# Patient Record
Sex: FEMALE | Employment: UNEMPLOYED | URBAN - METROPOLITAN AREA
[De-identification: names, ages, dates, MRNs, and addresses within clinical notes are randomized per-mention and may not be internally consistent; named-entity substitution may affect disease eponyms.]

---

## 2018-09-06 NOTE — H&P
[address] [facility] [address] Gastroenterology                                                                                                  Clinic History and Physical     Pa esophageal, gastric cancer  - No family history of IBD.     Prior endoscopies:  ~10 - 15 yrs ago, EGD performed by unspecified GI practice, findings per patient: Peptic ulcer disease, started on Nexium    Social Hx:  - No smoking  + social etoh  - Denies il Blood pressure 124/73, pulse 51, resp. rate 16, height 5' 6\" (1.676 m), weight 163 lb (73.9 kg).     Gen: patient appears comfortable and in no acute distress  HEENT: conjunctiva pink, the sclera appears anicteric, oropharynx clear, mucus membranes appea habits are still irregular compared to her baseline but has substantially improved since she was on antibiotic therapy. She continues a fiber supplement and probiotics daily which has been helpful. I would continue these measures and cautiously observe.

## 2018-09-17 ENCOUNTER — TELEPHONE (OUTPATIENT)
Dept: GASTROENTEROLOGY | Facility: CLINIC | Age: 48
End: 2018-09-17

## 2018-09-17 ENCOUNTER — OFFICE VISIT (OUTPATIENT)
Dept: GASTROENTEROLOGY | Facility: CLINIC | Age: 48
End: 2018-09-17
Payer: COMMERCIAL

## 2018-09-17 VITALS
WEIGHT: 163 LBS | HEART RATE: 51 BPM | DIASTOLIC BLOOD PRESSURE: 73 MMHG | RESPIRATION RATE: 16 BRPM | HEIGHT: 66 IN | SYSTOLIC BLOOD PRESSURE: 124 MMHG | BODY MASS INDEX: 26.2 KG/M2

## 2018-09-17 DIAGNOSIS — Z83.71 FAMILY HISTORY OF COLONIC POLYPS: Primary | ICD-10-CM

## 2018-09-17 DIAGNOSIS — Z80.0 FAMILY HISTORY OF COLON CANCER: ICD-10-CM

## 2018-09-17 DIAGNOSIS — R19.8 IRREGULAR BOWEL HABITS: ICD-10-CM

## 2018-09-17 PROCEDURE — 99212 OFFICE O/P EST SF 10 MIN: CPT | Performed by: NURSE PRACTITIONER

## 2018-09-17 PROCEDURE — 99203 OFFICE O/P NEW LOW 30 MIN: CPT | Performed by: NURSE PRACTITIONER

## 2018-09-17 RX ORDER — ESCITALOPRAM OXALATE 20 MG/1
TABLET ORAL
COMMUNITY
Start: 2018-08-27

## 2018-09-17 RX ORDER — MULTIVIT-MIN/IRON FUM/FOLIC AC 7.5 MG-4
1 TABLET ORAL DAILY
COMMUNITY

## 2018-09-17 NOTE — PATIENT INSTRUCTIONS
1. Schedule colonoscopy with Dr. Sabrina Marrero w/ IV Alder or MAC    2.  bowel prep from pharmacy - split dose Colyte    3. Continue all medications for procedure     4. Read all bowel prep instructions carefully    5.  AVOID seeds, nuts, popcorn,

## 2018-09-17 NOTE — TELEPHONE ENCOUNTER
Scheduled for:  Colon   Provider Name: Dr. Pooja Shannon  Date:  10/30/18  Location:  [facility]  Sedation:  IV  Time:  1pm, patient to arrive at 12pm  Prep: Colyte  Meds/Allergies Reconciled?:  yes  Diagnosis with codes:  Z83.71/Z80.0  Was patient informed to call insu

## 2018-10-30 ENCOUNTER — HOSPITAL ENCOUNTER (OUTPATIENT)
Facility: HOSPITAL | Age: 48
Setting detail: HOSPITAL OUTPATIENT SURGERY
Discharge: HOME | End: 2018-10-30
Attending: INTERNAL MEDICINE | Admitting: INTERNAL MEDICINE
Payer: COMMERCIAL

## 2018-10-30 VITALS
DIASTOLIC BLOOD PRESSURE: 99 MMHG | HEART RATE: 58 BPM | OXYGEN SATURATION: 99 % | RESPIRATION RATE: 20 BRPM | HEIGHT: 67 IN | SYSTOLIC BLOOD PRESSURE: 137 MMHG | BODY MASS INDEX: 24.33 KG/M2 | WEIGHT: 155 LBS

## 2018-10-30 DIAGNOSIS — Z83.71 FAMILY HISTORY OF COLONIC POLYPS: ICD-10-CM

## 2018-10-30 DIAGNOSIS — Z80.0 FAMILY HISTORY OF COLON CANCER: ICD-10-CM

## 2018-10-30 PROCEDURE — 45380 COLONOSCOPY AND BIOPSY: CPT | Performed by: INTERNAL MEDICINE

## 2018-10-30 PROCEDURE — 0DBL8ZX EXCISION OF TRANSVERSE COLON, VIA NATURAL OR ARTIFICIAL OPENING ENDOSCOPIC, DIAGNOSTIC: ICD-10-PCS | Performed by: INTERNAL MEDICINE

## 2018-10-30 PROCEDURE — 45385 COLONOSCOPY W/LESION REMOVAL: CPT | Performed by: INTERNAL MEDICINE

## 2018-10-30 PROCEDURE — G0500 MOD SEDAT ENDO SERVICE >5YRS: HCPCS | Performed by: INTERNAL MEDICINE

## 2018-10-30 PROCEDURE — 0DBE8ZX EXCISION OF LARGE INTESTINE, VIA NATURAL OR ARTIFICIAL OPENING ENDOSCOPIC, DIAGNOSTIC: ICD-10-PCS | Performed by: INTERNAL MEDICINE

## 2018-10-30 RX ORDER — SODIUM CHLORIDE 0.9 % (FLUSH) 0.9 %
10 SYRINGE (ML) INJECTION AS NEEDED
Status: DISCONTINUED | OUTPATIENT
Start: 2018-10-30 | End: 2018-10-30

## 2018-10-30 RX ORDER — MIDAZOLAM HYDROCHLORIDE 1 MG/ML
INJECTION INTRAMUSCULAR; INTRAVENOUS
Status: DISCONTINUED | OUTPATIENT
Start: 2018-10-30 | End: 2018-10-30

## 2018-10-30 RX ORDER — MIDAZOLAM HYDROCHLORIDE 1 MG/ML
1 INJECTION INTRAMUSCULAR; INTRAVENOUS EVERY 5 MIN PRN
Status: DISCONTINUED | OUTPATIENT
Start: 2018-10-30 | End: 2018-10-30

## 2018-10-30 RX ORDER — SODIUM CHLORIDE, SODIUM LACTATE, POTASSIUM CHLORIDE, CALCIUM CHLORIDE 600; 310; 30; 20 MG/100ML; MG/100ML; MG/100ML; MG/100ML
INJECTION, SOLUTION INTRAVENOUS CONTINUOUS
Status: DISCONTINUED | OUTPATIENT
Start: 2018-10-30 | End: 2018-10-30

## 2018-10-30 NOTE — H&P
History & Physical Examination    Patient Name: Pritesh Jane  MRN: [id number]  [facility]: [id number]  YOB: 1970    Diagnosis: Family history of colon polyps, colorectal cancer screening, post infectious IBS        Medications Prior to Admission: patient/family. They understand and agree to proceed with plan of care. [ x ] I have reviewed the History and Physical done within the last 30 days. Any changes noted above.     Akash Alfonso MD  10/30/2018  1:34 PM

## 2018-10-30 NOTE — OPERATIVE REPORT
[facility] Endoscopy Report      Date of Procedure:  10/30/18      Preoperative Diagnosis:  1. Family history of colon polyps and colon cancer  2. Postinfectious IBS      Postoperative Diagnosis:  1. Colon polyp  2.   Pancolonic diverticu No ongoing bleeding. There were no other colonic polyps, mass lesions, vascular anomalies or signs of inflammation seen. Random biopsies were taken upon withdrawal to exclude a microscopic colitis. Retroflexion in the rectum revealed no abnormalities.

## 2018-11-05 ENCOUNTER — TELEPHONE (OUTPATIENT)
Dept: GASTROENTEROLOGY | Facility: CLINIC | Age: 48
End: 2018-11-05

## 2018-11-05 NOTE — TELEPHONE ENCOUNTER
----- Message from Katina Sethi MD sent at 11/3/2018  2:54 PM CDT -----  I spoke to the patient. She is feeling well. She had a subcentimeter sessile serrated adenoma removed. I have discussed the significance.   I have recommended a follow-up co

## 2021-12-13 PROBLEM — N95.1 PERIMENOPAUSAL: Status: ACTIVE | Noted: 2021-12-13

## 2023-09-11 ENCOUNTER — TELEPHONE (OUTPATIENT)
Facility: CLINIC | Age: 53
End: 2023-09-11

## 2023-09-11 NOTE — TELEPHONE ENCOUNTER
----- Message from Marilyn Headley RN sent at 11/5/2018 12:53 PM CST -----  Regarding: Recall colon in 5 years per Dr. Mendel Parsley. Colon done 10/30/18  Recall colon in 5 years per Dr. Mendel Parsley.  Colon done 10/30/18

## (undated) DEVICE — FORCEP RADIAL JAW 4

## (undated) DEVICE — ENDOSCOPY PACK - LOWER: Brand: MEDLINE INDUSTRIES, INC.

## (undated) DEVICE — Device: Brand: DEFENDO AIR/WATER/SUCTION AND BIOPSY VALVE

## (undated) DEVICE — SNARE CAPTIFLEX STD OVAL OLY

## (undated) NOTE — LETTER
9/11/2023    [facility] [address] [id number]            Dear Saida Rock,      Our records indicate that you are due for an appointment for a Colonoscopy with Cathy Low MD. Our doctors are booking out about 3-5 months in advance for procedures. Please call our office to schedule a phone screening appointment to plan for the procedure(s). Your medical well-being is important to us. If your insurance requires a referral, please call your primary care office to request one.       Thank you,      The Physicians and Staff at [facility]